# Patient Record
(demographics unavailable — no encounter records)

---

## 2025-06-26 NOTE — DISCUSSION/SUMMARY
[FreeTextEntry1] : 1.  Exercise stress test and echocardiogram given his EKG and his multiple risk factors including family history. 2.  Check calcium score to help with risk stratification.  Blood work with his primary in September and will use that to determine if he needs statin therapy. 3.  Continue lisinopril 40 mg daily for hypertension. 4.  Monitor BP at home, keep a log and bring to f/u. 5.  He will have sleep study given his snoring and his body habitus. 6.  Patient encouraged to work on diet to lose weight. 7.  Patient encouraged to work on a healthy diet high in lean protein, whole grains and vegetables, and lower in white flour and simple sugars. 8.  Patient strongly encouraged on reducing salt intake. 9.  Follow up here after testing, and will make further recommendations at that time. [EKG obtained to assist in diagnosis and management of assessed problem(s)] : EKG obtained to assist in diagnosis and management of assessed problem(s)

## 2025-06-26 NOTE — HISTORY OF PRESENT ILLNESS
[FreeTextEntry1] : Patient presents today for evaluation after seeing his primary recently having had an EKG which she was told was abnormal.  He is not clear as to what was exactly abnormal about it and forgot to bring the copy he had with him today.  He says he has had EKGs in the past but never in this doctor's office and there was nothing to compare.  Symptomatically he feels well and offers no complaints.  He is active and trying to be more active without any symptoms limitations.  Blood pressures have been in the 120s over 70s to 80s when he checks them at home.  He takes lisinopril and tolerates that without a problem.  Patient denies chest pain, shortness of breath, palpitations, orthopnea, presyncope, syncope.

## 2025-06-26 NOTE — ASSESSMENT
[FreeTextEntry1] : EKG: Sinus rhythm with no significant ST or T wave changes.  Left atrial enlargement.  Incomplete right bundle branch block.  47-year-old man with a past medical history of hypertension, dyslipidemia, borderline diabetes, family history of CAD in his father who had an MI at 55 who presents to me for evaluation because of abnormal EKG.  EKG in the office today shows left atrial enlargement and incomplete right bundle branch block.  It is unclear exactly what was seen at his primary's office but I will try to get a copy of that EKG.  Patient is asymptomatic at this time.  Blood pressure does appear to be controlled.  Recent blood work shows borderline LDL of 130 but this is more significant in the setting of an A1c of 6.7.  He is going to have repeat blood work in September and if his A1c has not come down or his LDL is gone up further would strongly consider statin therapy.  I recommended a calcium score to help with risk stratification as well.  Also given his family history and his EKG I have recommended echocardiogram and stress testing.  He also reports snoring and given his body habitus I recommended a sleep study.